# Patient Record
Sex: MALE | Race: WHITE
[De-identification: names, ages, dates, MRNs, and addresses within clinical notes are randomized per-mention and may not be internally consistent; named-entity substitution may affect disease eponyms.]

---
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
## 2025-05-22 NOTE — EDPHY
H & P


Time Seen by Provider: 09/28/17 07:28


HPI/ROS: 





12-year-old male presents complaining of jammed his right thumb while playing 

basketball, now with swelling and difficulty moving thumb as well as tenderness 

to palpation.





Review of systems


As per HPI


General no fever no chills no weakness


HEENT no eye pain no eye discharge. No eye redness, no sore throat


Respiratory no cough, no shortness of breath


Cardiac no chest pain, no peripheral edema


GI no abdominal pain, no diarrhea, no constipation, no nausea, no vomiting


  no flank pain, no hematuria, no dysuria


Musculoskeletal no myalgias, positive joint pain


Heme  no easy bruising, no easy bleeding


Endo no polyuria, no polydipsia


Skin no rashes, no pruritus


Neuro no syncope, no dizziness, no headaches





Past Medical/Surgical History: 





None


Social History: 





Attends middle school


Smoking Status: Never smoked


Physical Exam: 





Alert and oriented in no acute distress nontoxic appearance, afebrile


Atraumatic normocephalic


Neck no JVD


Lungs clear to auscultation, no respiratory distress


Heart regular rate and rhythm


Extremities no cyanosis clubbing edema


Except right hand right thumb


Swelling and erythema to right thumb most tender to palpation at 1st metacarpal

, difficulty making a full fist but able to move all joints


Good capillary refill, sensation intact


Constitutional: 


 Initial Vital Signs











Temperature (C)  36.7 C   09/28/17 07:24


 


Heart Rate  90   09/28/17 07:24


 


Respiratory Rate  20 H  09/28/17 07:24


 


Blood Pressure  127/83 H  09/28/17 07:24


 


O2 Sat (%)  98   09/28/17 07:24








 











O2 Delivery Mode               Room Air














Allergies/Adverse Reactions: 


 





No Known Allergies Allergy (Verified 09/28/17 07:26)


 








Home Medications: 














 Medication  Instructions  Recorded


 


NK [No Known Home Meds]  09/28/17














Medical Decision Making





- Diagnostics


Imaging Results: 


 Imaging Impressions





Finger X-Ray  09/28/17 07:31


Impression:


 


No acute bony abnormalities.











ED Course/Re-evaluation: 





Patient seen and evaluated for right thumb injury





Right thumb film


Negative for fracture








Impression


Thumb sprain, contusion, right





Plan


Preformed thumb spica splint


Rest elevation


Follow-up PCP


Follow-up and surgery of not improving, given referral to Dr. Cee


Differential Diagnosis: 





Thumb sprain, some contusion, thumb fracture





Departure





- Departure


Disposition: Home, Routine, Self-Care


Clinical Impression: 


 Sprain of right thumb, Thumb contusion





Condition: Good


Instructions:  Finger Sprain (ED)


Additional Instructions: 


Wear splint to rest, and decrease swelling of your thumb, for 3-7 days.


[place] increase use of thumb.


If not improving follow up with your pediatrician.





I will also list the hand specialist referral if worsening.


Referrals: 


Bridgette Santiago MD [Primary Care Provider] - As per Instructions


Chilango Cee MD [Medical Doctor] - As per Instructions


Stand Alone Forms:  Physical Education Excuse, School Excuse no